# Patient Record
Sex: FEMALE | Race: WHITE | Employment: UNEMPLOYED | URBAN - METROPOLITAN AREA
[De-identification: names, ages, dates, MRNs, and addresses within clinical notes are randomized per-mention and may not be internally consistent; named-entity substitution may affect disease eponyms.]

---

## 2024-08-08 ENCOUNTER — APPOINTMENT (OUTPATIENT)
Dept: CT IMAGING | Age: 79
End: 2024-08-08
Payer: MEDICARE

## 2024-08-08 ENCOUNTER — HOSPITAL ENCOUNTER (OUTPATIENT)
Age: 79
Setting detail: OBSERVATION
Discharge: HOME | End: 2024-08-10
Attending: EMERGENCY MEDICINE | Admitting: FAMILY MEDICINE
Payer: MEDICARE

## 2024-08-08 ENCOUNTER — APPOINTMENT (OUTPATIENT)
Dept: GENERAL RADIOLOGY | Age: 79
End: 2024-08-08
Payer: MEDICARE

## 2024-08-08 DIAGNOSIS — R26.81 GAIT INSTABILITY: ICD-10-CM

## 2024-08-08 DIAGNOSIS — R91.1 LUNG NODULE SEEN ON IMAGING STUDY: ICD-10-CM

## 2024-08-08 DIAGNOSIS — A60.09 HERPES GENITALIS IN WOMEN: Chronic | ICD-10-CM

## 2024-08-08 DIAGNOSIS — I25.85 CHRONIC CORONARY MICROVASCULAR DYSFUNCTION: ICD-10-CM

## 2024-08-08 DIAGNOSIS — R42 DIZZINESS: Primary | ICD-10-CM

## 2024-08-08 DIAGNOSIS — E78.5 DYSLIPIDEMIA: ICD-10-CM

## 2024-08-08 DIAGNOSIS — I77.9 CAROTID ARTERY DISEASE, UNSPECIFIED LATERALITY, UNSPECIFIED TYPE (HCC): ICD-10-CM

## 2024-08-08 DIAGNOSIS — R07.9 CHEST PAIN, UNSPECIFIED TYPE: ICD-10-CM

## 2024-08-08 DIAGNOSIS — I10 PRIMARY HYPERTENSION: ICD-10-CM

## 2024-08-08 DIAGNOSIS — Z95.1 S/P CABG (CORONARY ARTERY BYPASS GRAFT): ICD-10-CM

## 2024-08-08 DIAGNOSIS — I10 ESSENTIAL HYPERTENSION: ICD-10-CM

## 2024-08-08 LAB
ALBUMIN SERPL-MCNC: 3 G/DL (ref 3.2–4.6)
ALBUMIN/GLOB SERPL: 1 (ref 1–1.9)
ALP SERPL-CCNC: 69 U/L (ref 35–104)
ALT SERPL-CCNC: 21 U/L (ref 12–65)
ANION GAP SERPL CALC-SCNC: 13 MMOL/L (ref 9–18)
AST SERPL-CCNC: 24 U/L (ref 15–37)
BASOPHILS # BLD: 0.1 K/UL (ref 0–0.2)
BASOPHILS NFR BLD: 1 % (ref 0–2)
BILIRUB SERPL-MCNC: <0.2 MG/DL (ref 0–1.2)
BUN SERPL-MCNC: 23 MG/DL (ref 8–23)
CALCIUM SERPL-MCNC: 10.2 MG/DL (ref 8.8–10.2)
CHLORIDE SERPL-SCNC: 92 MMOL/L (ref 98–107)
CO2 SERPL-SCNC: 24 MMOL/L (ref 20–28)
CREAT SERPL-MCNC: 0.72 MG/DL (ref 0.6–1.1)
DIFFERENTIAL METHOD BLD: ABNORMAL
EOSINOPHIL # BLD: 0.3 K/UL (ref 0–0.8)
EOSINOPHIL NFR BLD: 5 % (ref 0.5–7.8)
ERYTHROCYTE [DISTWIDTH] IN BLOOD BY AUTOMATED COUNT: 11.9 % (ref 11.9–14.6)
GLOBULIN SER CALC-MCNC: 3 G/DL (ref 2.3–3.5)
GLUCOSE SERPL-MCNC: 132 MG/DL (ref 70–99)
HCT VFR BLD AUTO: 38.3 % (ref 35.8–46.3)
HGB BLD-MCNC: 13.5 G/DL (ref 11.7–15.4)
IMM GRANULOCYTES # BLD AUTO: 0 K/UL (ref 0–0.5)
IMM GRANULOCYTES NFR BLD AUTO: 1 % (ref 0–5)
LIPASE SERPL-CCNC: 32 U/L (ref 13–60)
LYMPHOCYTES # BLD: 1.3 K/UL (ref 0.5–4.6)
LYMPHOCYTES NFR BLD: 25 % (ref 13–44)
MAGNESIUM SERPL-MCNC: 1.7 MG/DL (ref 1.8–2.4)
MCH RBC QN AUTO: 28.7 PG (ref 26.1–32.9)
MCHC RBC AUTO-ENTMCNC: 35.2 G/DL (ref 31.4–35)
MCV RBC AUTO: 81.5 FL (ref 82–102)
MONOCYTES # BLD: 0.7 K/UL (ref 0.1–1.3)
MONOCYTES NFR BLD: 14 % (ref 4–12)
NEUTS SEG # BLD: 2.7 K/UL (ref 1.7–8.2)
NEUTS SEG NFR BLD: 54 % (ref 43–78)
NRBC # BLD: 0 K/UL (ref 0–0.2)
NT PRO BNP: 256 PG/ML (ref 0–450)
PLATELET # BLD AUTO: 203 K/UL (ref 150–450)
PMV BLD AUTO: 10.3 FL (ref 9.4–12.3)
POTASSIUM SERPL-SCNC: 3.7 MMOL/L (ref 3.5–5.1)
PROT SERPL-MCNC: 6 G/DL (ref 6.3–8.2)
RBC # BLD AUTO: 4.7 M/UL (ref 4.05–5.2)
SODIUM SERPL-SCNC: 129 MMOL/L (ref 136–145)
TROPONIN T SERPL HS-MCNC: 13 NG/L (ref 0–14)
TROPONIN T SERPL HS-MCNC: 18 NG/L (ref 0–14)
WBC # BLD AUTO: 5 K/UL (ref 4.3–11.1)

## 2024-08-08 PROCEDURE — 84484 ASSAY OF TROPONIN QUANT: CPT

## 2024-08-08 PROCEDURE — 85025 COMPLETE CBC W/AUTO DIFF WBC: CPT

## 2024-08-08 PROCEDURE — 6360000002 HC RX W HCPCS: Performed by: EMERGENCY MEDICINE

## 2024-08-08 PROCEDURE — 83880 ASSAY OF NATRIURETIC PEPTIDE: CPT

## 2024-08-08 PROCEDURE — 99285 EMERGENCY DEPT VISIT HI MDM: CPT

## 2024-08-08 PROCEDURE — 71275 CT ANGIOGRAPHY CHEST: CPT

## 2024-08-08 PROCEDURE — 83690 ASSAY OF LIPASE: CPT

## 2024-08-08 PROCEDURE — 71045 X-RAY EXAM CHEST 1 VIEW: CPT

## 2024-08-08 PROCEDURE — 80053 COMPREHEN METABOLIC PANEL: CPT

## 2024-08-08 PROCEDURE — 6360000004 HC RX CONTRAST MEDICATION: Performed by: EMERGENCY MEDICINE

## 2024-08-08 PROCEDURE — 70450 CT HEAD/BRAIN W/O DYE: CPT

## 2024-08-08 PROCEDURE — 96365 THER/PROPH/DIAG IV INF INIT: CPT

## 2024-08-08 PROCEDURE — 83735 ASSAY OF MAGNESIUM: CPT

## 2024-08-08 RX ORDER — NITROGLYCERIN 0.4 MG/1
0.4 TABLET SUBLINGUAL EVERY 5 MIN PRN
Status: DISCONTINUED | OUTPATIENT
Start: 2024-08-08 | End: 2024-08-10 | Stop reason: HOSPADM

## 2024-08-08 RX ORDER — MAGNESIUM SULFATE 1 G/100ML
1000 INJECTION INTRAVENOUS
Status: COMPLETED | OUTPATIENT
Start: 2024-08-08 | End: 2024-08-09

## 2024-08-08 RX ADMIN — MAGNESIUM SULFATE HEPTAHYDRATE 1000 MG: 1 INJECTION, SOLUTION INTRAVENOUS at 23:09

## 2024-08-08 RX ADMIN — IOPAMIDOL 100 ML: 755 INJECTION, SOLUTION INTRAVENOUS at 22:13

## 2024-08-09 ENCOUNTER — APPOINTMENT (OUTPATIENT)
Dept: NON INVASIVE DIAGNOSTICS | Age: 79
End: 2024-08-09
Attending: FAMILY MEDICINE
Payer: MEDICARE

## 2024-08-09 PROBLEM — R07.9 CHEST PAIN: Status: ACTIVE | Noted: 2024-08-09

## 2024-08-09 PROBLEM — R42 DIZZINESS: Status: ACTIVE | Noted: 2024-08-09

## 2024-08-09 LAB
ANION GAP SERPL CALC-SCNC: 12 MMOL/L (ref 9–18)
APPEARANCE UR: CLEAR
BACTERIA URNS QL MICRO: NEGATIVE /HPF
BILIRUB UR QL: NEGATIVE
BUN SERPL-MCNC: 18 MG/DL (ref 8–23)
CALCIUM SERPL-MCNC: 9.7 MG/DL (ref 8.8–10.2)
CASTS URNS QL MICRO: 0 /LPF
CHLORIDE SERPL-SCNC: 100 MMOL/L (ref 98–107)
CHOLEST SERPL-MCNC: 135 MG/DL (ref 0–200)
CO2 SERPL-SCNC: 25 MMOL/L (ref 20–28)
COLOR UR: NORMAL
CREAT SERPL-MCNC: 0.71 MG/DL (ref 0.6–1.1)
CRYSTALS URNS QL MICRO: 0 /LPF
EPI CELLS #/AREA URNS HPF: NORMAL /HPF
ERYTHROCYTE [DISTWIDTH] IN BLOOD BY AUTOMATED COUNT: 12 % (ref 11.9–14.6)
EST. AVERAGE GLUCOSE BLD GHB EST-MCNC: 134 MG/DL
GLUCOSE SERPL-MCNC: 118 MG/DL (ref 70–99)
GLUCOSE UR STRIP.AUTO-MCNC: NEGATIVE MG/DL
HBA1C MFR BLD: 6.3 % (ref 0–5.6)
HCT VFR BLD AUTO: 40.3 % (ref 35.8–46.3)
HDLC SERPL-MCNC: 41 MG/DL (ref 40–60)
HDLC SERPL: 3.3 (ref 0–5)
HGB BLD-MCNC: 14.1 G/DL (ref 11.7–15.4)
HGB UR QL STRIP: NEGATIVE
HYALINE CASTS URNS QL MICRO: NORMAL /LPF
KETONES UR QL STRIP.AUTO: NEGATIVE MG/DL
LDLC SERPL CALC-MCNC: 76 MG/DL (ref 0–100)
LEUKOCYTE ESTERASE UR QL STRIP.AUTO: NEGATIVE
MCH RBC QN AUTO: 28.5 PG (ref 26.1–32.9)
MCHC RBC AUTO-ENTMCNC: 35 G/DL (ref 31.4–35)
MCV RBC AUTO: 81.4 FL (ref 82–102)
MUCOUS THREADS URNS QL MICRO: 0 /LPF
NITRITE UR QL STRIP.AUTO: NEGATIVE
NRBC # BLD: 0 K/UL (ref 0–0.2)
PH UR STRIP: 5 (ref 5–9)
PLATELET # BLD AUTO: 191 K/UL (ref 150–450)
PMV BLD AUTO: 10.1 FL (ref 9.4–12.3)
POTASSIUM SERPL-SCNC: 4 MMOL/L (ref 3.5–5.1)
PROT UR STRIP-MCNC: NEGATIVE MG/DL
RBC # BLD AUTO: 4.95 M/UL (ref 4.05–5.2)
RBC #/AREA URNS HPF: NORMAL /HPF
SODIUM SERPL-SCNC: 136 MMOL/L (ref 136–145)
SP GR UR REFRACTOMETRY: 1.02 (ref 1–1.02)
TRIGL SERPL-MCNC: 89 MG/DL (ref 0–150)
TROPONIN T SERPL HS-MCNC: 14 NG/L (ref 0–14)
URINE CULTURE IF INDICATED: NORMAL
UROBILINOGEN UR QL STRIP.AUTO: 0.2 EU/DL (ref 0.2–1)
VLDLC SERPL CALC-MCNC: 18 MG/DL (ref 6–23)
WBC # BLD AUTO: 5 K/UL (ref 4.3–11.1)
WBC URNS QL MICRO: NORMAL /HPF

## 2024-08-09 PROCEDURE — 85027 COMPLETE CBC AUTOMATED: CPT

## 2024-08-09 PROCEDURE — 94640 AIRWAY INHALATION TREATMENT: CPT

## 2024-08-09 PROCEDURE — 36415 COLL VENOUS BLD VENIPUNCTURE: CPT

## 2024-08-09 PROCEDURE — 6370000000 HC RX 637 (ALT 250 FOR IP): Performed by: FAMILY MEDICINE

## 2024-08-09 PROCEDURE — 80048 BASIC METABOLIC PNL TOTAL CA: CPT

## 2024-08-09 PROCEDURE — 96375 TX/PRO/DX INJ NEW DRUG ADDON: CPT

## 2024-08-09 PROCEDURE — 2580000003 HC RX 258: Performed by: FAMILY MEDICINE

## 2024-08-09 PROCEDURE — 2580000003 HC RX 258: Performed by: EMERGENCY MEDICINE

## 2024-08-09 PROCEDURE — 6370000000 HC RX 637 (ALT 250 FOR IP): Performed by: EMERGENCY MEDICINE

## 2024-08-09 PROCEDURE — 97112 NEUROMUSCULAR REEDUCATION: CPT

## 2024-08-09 PROCEDURE — 97165 OT EVAL LOW COMPLEX 30 MIN: CPT

## 2024-08-09 PROCEDURE — 81001 URINALYSIS AUTO W/SCOPE: CPT

## 2024-08-09 PROCEDURE — 84484 ASSAY OF TROPONIN QUANT: CPT

## 2024-08-09 PROCEDURE — G0378 HOSPITAL OBSERVATION PER HR: HCPCS

## 2024-08-09 PROCEDURE — 83036 HEMOGLOBIN GLYCOSYLATED A1C: CPT

## 2024-08-09 PROCEDURE — 80061 LIPID PANEL: CPT

## 2024-08-09 PROCEDURE — 6370000000 HC RX 637 (ALT 250 FOR IP): Performed by: INTERNAL MEDICINE

## 2024-08-09 PROCEDURE — 6360000002 HC RX W HCPCS: Performed by: EMERGENCY MEDICINE

## 2024-08-09 PROCEDURE — 97535 SELF CARE MNGMENT TRAINING: CPT

## 2024-08-09 RX ORDER — ONDANSETRON 2 MG/ML
4 INJECTION INTRAMUSCULAR; INTRAVENOUS EVERY 6 HOURS PRN
Status: DISCONTINUED | OUTPATIENT
Start: 2024-08-09 | End: 2024-08-10 | Stop reason: HOSPADM

## 2024-08-09 RX ORDER — IPRATROPIUM BROMIDE AND ALBUTEROL SULFATE 2.5; .5 MG/3ML; MG/3ML
1 SOLUTION RESPIRATORY (INHALATION)
Status: DISCONTINUED | OUTPATIENT
Start: 2024-08-09 | End: 2024-08-09

## 2024-08-09 RX ORDER — IPRATROPIUM BROMIDE AND ALBUTEROL SULFATE 2.5; .5 MG/3ML; MG/3ML
1 SOLUTION RESPIRATORY (INHALATION) EVERY 4 HOURS PRN
Status: DISCONTINUED | OUTPATIENT
Start: 2024-08-09 | End: 2024-08-10 | Stop reason: HOSPADM

## 2024-08-09 RX ORDER — AMLODIPINE BESYLATE 5 MG/1
TABLET ORAL
Status: ON HOLD | COMMUNITY
Start: 2023-12-05 | End: 2024-08-10 | Stop reason: HOSPADM

## 2024-08-09 RX ORDER — PANTOPRAZOLE SODIUM 40 MG/1
40 TABLET, DELAYED RELEASE ORAL
Status: DISCONTINUED | OUTPATIENT
Start: 2024-08-09 | End: 2024-08-09

## 2024-08-09 RX ORDER — LOSARTAN POTASSIUM 50 MG/1
50 TABLET ORAL DAILY
Status: DISCONTINUED | OUTPATIENT
Start: 2024-08-09 | End: 2024-08-09

## 2024-08-09 RX ORDER — LOSARTAN POTASSIUM 50 MG/1
50 TABLET ORAL 2 TIMES DAILY
Status: DISCONTINUED | OUTPATIENT
Start: 2024-08-09 | End: 2024-08-10 | Stop reason: HOSPADM

## 2024-08-09 RX ORDER — ASPIRIN 81 MG/1
81 TABLET, CHEWABLE ORAL
COMMUNITY

## 2024-08-09 RX ORDER — IPRATROPIUM BROMIDE AND ALBUTEROL SULFATE 2.5; .5 MG/3ML; MG/3ML
1 SOLUTION RESPIRATORY (INHALATION)
Status: COMPLETED | OUTPATIENT
Start: 2024-08-09 | End: 2024-08-09

## 2024-08-09 RX ORDER — ATORVASTATIN CALCIUM 40 MG/1
40 TABLET, FILM COATED ORAL NIGHTLY
Status: DISCONTINUED | OUTPATIENT
Start: 2024-08-09 | End: 2024-08-10 | Stop reason: HOSPADM

## 2024-08-09 RX ORDER — VALSARTAN 160 MG/1
160 TABLET ORAL 2 TIMES DAILY
Status: ON HOLD | COMMUNITY
Start: 2016-04-29 | End: 2024-08-10 | Stop reason: HOSPADM

## 2024-08-09 RX ORDER — CARVEDILOL 6.25 MG/1
6.25 TABLET ORAL 2 TIMES DAILY WITH MEALS
Status: DISCONTINUED | OUTPATIENT
Start: 2024-08-09 | End: 2024-08-09

## 2024-08-09 RX ORDER — CHOLECALCIFEROL (VITAMIN D3) 25 MCG
2000 TABLET ORAL DAILY
COMMUNITY

## 2024-08-09 RX ORDER — LOSARTAN POTASSIUM 50 MG/1
50 TABLET ORAL 2 TIMES DAILY
Status: DISCONTINUED | OUTPATIENT
Start: 2024-08-10 | End: 2024-08-09

## 2024-08-09 RX ORDER — AMLODIPINE BESYLATE 10 MG/1
5 TABLET ORAL DAILY
Status: DISCONTINUED | OUTPATIENT
Start: 2024-08-09 | End: 2024-08-09

## 2024-08-09 RX ORDER — ATORVASTATIN CALCIUM 40 MG/1
TABLET, FILM COATED ORAL
Status: ON HOLD | COMMUNITY
End: 2024-08-10 | Stop reason: HOSPADM

## 2024-08-09 RX ORDER — POLYETHYLENE GLYCOL 3350 17 G/17G
17 POWDER, FOR SOLUTION ORAL DAILY PRN
Status: DISCONTINUED | OUTPATIENT
Start: 2024-08-09 | End: 2024-08-10 | Stop reason: HOSPADM

## 2024-08-09 RX ORDER — SODIUM CHLORIDE 9 MG/ML
INJECTION, SOLUTION INTRAVENOUS CONTINUOUS
Status: DISCONTINUED | OUTPATIENT
Start: 2024-08-09 | End: 2024-08-10 | Stop reason: HOSPADM

## 2024-08-09 RX ORDER — SODIUM CHLORIDE 0.9 % (FLUSH) 0.9 %
5-40 SYRINGE (ML) INJECTION EVERY 12 HOURS SCHEDULED
Status: DISCONTINUED | OUTPATIENT
Start: 2024-08-09 | End: 2024-08-10 | Stop reason: HOSPADM

## 2024-08-09 RX ORDER — SODIUM CHLORIDE 9 MG/ML
INJECTION, SOLUTION INTRAVENOUS PRN
Status: DISCONTINUED | OUTPATIENT
Start: 2024-08-09 | End: 2024-08-10 | Stop reason: HOSPADM

## 2024-08-09 RX ORDER — ASPIRIN 81 MG/1
81 TABLET, CHEWABLE ORAL
Status: DISCONTINUED | OUTPATIENT
Start: 2024-08-09 | End: 2024-08-10 | Stop reason: HOSPADM

## 2024-08-09 RX ORDER — DOXAZOSIN MESYLATE 1 MG/1
1 TABLET ORAL DAILY
Status: DISCONTINUED | OUTPATIENT
Start: 2024-08-09 | End: 2024-08-09

## 2024-08-09 RX ORDER — PANTOPRAZOLE SODIUM 40 MG/1
40 TABLET, DELAYED RELEASE ORAL
Status: ON HOLD | COMMUNITY
Start: 2024-03-11 | End: 2024-08-10 | Stop reason: HOSPADM

## 2024-08-09 RX ORDER — CARVEDILOL 6.25 MG/1
6.25 TABLET ORAL
Status: ON HOLD | COMMUNITY
Start: 2022-02-06 | End: 2024-08-10 | Stop reason: HOSPADM

## 2024-08-09 RX ORDER — ACETAMINOPHEN 325 MG/1
650 TABLET ORAL EVERY 4 HOURS PRN
Status: DISCONTINUED | OUTPATIENT
Start: 2024-08-09 | End: 2024-08-10 | Stop reason: HOSPADM

## 2024-08-09 RX ORDER — ALPRAZOLAM 0.5 MG/1
0.5 TABLET ORAL DAILY PRN
COMMUNITY

## 2024-08-09 RX ORDER — ALIROCUMAB 150 MG/ML
150 INJECTION, SOLUTION SUBCUTANEOUS ONCE
COMMUNITY
Start: 2022-01-26

## 2024-08-09 RX ORDER — MECLIZINE HYDROCHLORIDE 25 MG/1
25 TABLET ORAL 3 TIMES DAILY PRN
COMMUNITY
Start: 2024-03-11

## 2024-08-09 RX ORDER — ONDANSETRON 4 MG/1
4 TABLET, ORALLY DISINTEGRATING ORAL EVERY 8 HOURS PRN
Status: DISCONTINUED | OUTPATIENT
Start: 2024-08-09 | End: 2024-08-10 | Stop reason: HOSPADM

## 2024-08-09 RX ORDER — IPRATROPIUM BROMIDE AND ALBUTEROL SULFATE 2.5; .5 MG/3ML; MG/3ML
3 SOLUTION RESPIRATORY (INHALATION)
COMMUNITY
Start: 2024-03-10

## 2024-08-09 RX ORDER — DOXAZOSIN MESYLATE 1 MG/1
TABLET ORAL
Status: ON HOLD | COMMUNITY
Start: 2023-12-14 | End: 2024-08-10 | Stop reason: HOSPADM

## 2024-08-09 RX ORDER — SODIUM CHLORIDE 0.9 % (FLUSH) 0.9 %
5-40 SYRINGE (ML) INJECTION PRN
Status: DISCONTINUED | OUTPATIENT
Start: 2024-08-09 | End: 2024-08-10 | Stop reason: HOSPADM

## 2024-08-09 RX ORDER — MECLIZINE HYDROCHLORIDE 25 MG/1
25 TABLET ORAL 3 TIMES DAILY PRN
Status: DISCONTINUED | OUTPATIENT
Start: 2024-08-09 | End: 2024-08-10 | Stop reason: HOSPADM

## 2024-08-09 RX ORDER — LOSARTAN POTASSIUM 50 MG/1
50 TABLET ORAL
Status: ON HOLD | COMMUNITY
Start: 2024-02-29 | End: 2024-08-10 | Stop reason: HOSPADM

## 2024-08-09 RX ORDER — FUROSEMIDE 20 MG/1
20 TABLET ORAL DAILY PRN
COMMUNITY
Start: 2024-04-26 | End: 2025-04-26

## 2024-08-09 RX ORDER — LANOLIN ALCOHOL/MO/W.PET/CERES
1000 CREAM (GRAM) TOPICAL DAILY
COMMUNITY

## 2024-08-09 RX ORDER — OXYCODONE HCL 10 MG/1
10 TABLET, FILM COATED, EXTENDED RELEASE ORAL DAILY
Status: DISCONTINUED | OUTPATIENT
Start: 2024-08-09 | End: 2024-08-10 | Stop reason: HOSPADM

## 2024-08-09 RX ORDER — OXYCODONE HCL 10 MG/1
10 TABLET, FILM COATED, EXTENDED RELEASE ORAL DAILY
COMMUNITY

## 2024-08-09 RX ORDER — ALPRAZOLAM 0.5 MG/1
0.5 TABLET ORAL DAILY PRN
Status: DISCONTINUED | OUTPATIENT
Start: 2024-08-09 | End: 2024-08-10 | Stop reason: HOSPADM

## 2024-08-09 RX ADMIN — SODIUM CHLORIDE: 9 INJECTION, SOLUTION INTRAVENOUS at 06:58

## 2024-08-09 RX ADMIN — OXYCODONE HYDROCHLORIDE 10 MG: 10 TABLET, FILM COATED, EXTENDED RELEASE ORAL at 10:44

## 2024-08-09 RX ADMIN — ALPRAZOLAM 0.5 MG: 0.5 TABLET ORAL at 13:04

## 2024-08-09 RX ADMIN — LOSARTAN POTASSIUM 50 MG: 50 TABLET, FILM COATED ORAL at 10:44

## 2024-08-09 RX ADMIN — ACETAMINOPHEN 650 MG: 325 TABLET ORAL at 13:04

## 2024-08-09 RX ADMIN — IPRATROPIUM BROMIDE AND ALBUTEROL SULFATE 1 DOSE: 2.5; .5 SOLUTION RESPIRATORY (INHALATION) at 00:21

## 2024-08-09 RX ADMIN — ASPIRIN 81 MG 81 MG: 81 TABLET ORAL at 10:44

## 2024-08-09 RX ADMIN — LOSARTAN POTASSIUM 50 MG: 50 TABLET, FILM COATED ORAL at 22:29

## 2024-08-09 RX ADMIN — PANTOPRAZOLE SODIUM 40 MG: 40 INJECTION, POWDER, FOR SOLUTION INTRAVENOUS at 00:45

## 2024-08-09 ASSESSMENT — LIFESTYLE VARIABLES
HOW MANY STANDARD DRINKS CONTAINING ALCOHOL DO YOU HAVE ON A TYPICAL DAY: PATIENT DOES NOT DRINK
HOW OFTEN DO YOU HAVE A DRINK CONTAINING ALCOHOL: NEVER

## 2024-08-09 ASSESSMENT — ENCOUNTER SYMPTOMS
NAUSEA: 0
ABDOMINAL PAIN: 0
VOMITING: 0
WHEEZING: 0
BACK PAIN: 0
COUGH: 0

## 2024-08-09 ASSESSMENT — PAIN DESCRIPTION - LOCATION: LOCATION: HEAD

## 2024-08-09 ASSESSMENT — PAIN SCALES - GENERAL: PAINLEVEL_OUTOF10: 4

## 2024-08-09 NOTE — PROGRESS NOTES
4 Eyes Skin Assessment     NAME:  Faviola Jonas  YOB: 1945  MEDICAL RECORD NUMBER:  [id number]    The patient is being assessed for  Admission    I agree that at least one RN has performed a thorough Head to Toe Skin Assessment on the patient. ALL assessment sites listed below have been assessed.      Areas assessed by both nurses:    Head, Face, Ears, Shoulders, Back, Chest, Arms, Elbows, Hands, Sacrum. Buttock, Coccyx, Ischium, and Legs. Feet and Heels        Does the Patient have a Wound? No noted wound(s)       Kush Prevention initiated by RN: No  Wound Care Orders initiated by RN: No    Pressure Injury (Stage 3,4, Unstageable, DTI, NWPT, and Complex wounds) if present, place Wound referral order by RN under : No    New Ostomies, if present place, Ostomy referral order under : No     Nurse 1 eSignature: Electronically signed by Rebeka Shay RN on 8/9/24 at 2:06 PM EDT    **SHARE this note so that the co-signing nurse can place an eSignature**    Nurse 2 eSignature: Electronically signed by NATHAN BAUMAN RN on 8/9/24 at 2:08 PM EDT

## 2024-08-09 NOTE — ED TRIAGE NOTES
Pt c/o chest pain for several days, became severe today.  Pt also c/o dizziness and unsteadiness.  Pt took a total of ASA 4 tabs 325mg this morning, and another ASA 325mg one hour prior to arrival.  Pt has also taken NTG 4-5 tabs today as well as an additional NTG tab by EMS.

## 2024-08-09 NOTE — ACP (ADVANCE CARE PLANNING)
Advance Care Planning   Healthcare Decision Maker:    Primary Decision Maker: Yandy Friedman - Friend - [phone], friend,per pt friend will contact HCPOA if needed, but states she is able to make medical decisions for self.    Click here to complete Healthcare Decision Makers including selection of the Healthcare Decision Maker Relationship (ie \"Primary\").  Today we documented Decision Maker(s) consistent with Legal Next of Kin hierarchy.

## 2024-08-09 NOTE — ASSESSMENT & PLAN NOTE
- Unclear etiology  - Improved now  - Reportedly radiated through to back.  CTA was negative for dissection  - Monitor on remote tele  - [employer] x2  - Check echo

## 2024-08-09 NOTE — H&P
Hospitalist History and Physical   Admit Date:  2024  8:47 PM   Name:  Faviola Jonas   Age:  78 y.o.  Sex:  female  :  1945   MRN:  [id number]     Presenting Complaint: Chest pain, dizziness  Reason(s) for Admission: Dizziness [R42]     History of Present Illness:   Faviola Jonas is a 78 y.o. female with medical history of HTN, CAD who presented to ED with chest pain.  Reports pain has been ongoing for several days, seems to radiate to back.  She took 4 aspirins this morning, as well as another 325mg aspiring and 4-5 nitroglycerin tabs this evening prior to presentation to the ER.  Additionally, over the past 4-5 days, she also reports feeling dizzy which is causing her some difficulty walking.  Denies any recent falls or loss of consciousness.  Upon ER evaluation, patient was initially hypertensive with BP of 193/73.  Labs are unremarkable with exception of Na of 129 and Mg of 1.7.  Initial trop was 13.0, repeat was 18.0.  CT chest with contrast shows:  IMPRESSION:  Severe atherosclerotic vascular disease but no evidence of acute aortic injury. 7 mm spiculated nodule in the left upper lobe is concerning. Recommend either evaluation with PET/CT or follow-up chest CT in 3 months.  Moderate centrilobular emphysema with findings suggesting chronic bronchitis.  Prior CABG.  CT head shows:  IMPRESSION:  1. Chronic small vessel ischemic change.   2. Otherwise, no CT evidence for acute pathology.  3. If patient continues to have symptoms or if there remains any further  clinical concern, MRI may help better delineate.  Hospitalist asked to admit.    Review of Systems:  10 systems reviewed and negative except as noted in HPI.  Assessment & Plan:   * Dizziness  Assessment & Plan  - Unclear etiology  - Could be vertigo  - Improved now  - Restart home meclizine  - MRI brain ordered  - Checking echo  - Neuro checks, PT/OT consulted  - Also check UA    Chest pain  Assessment & Plan  - Unclear etiology  -  Oral    valsartan (DIOVAN) 160 mg, Oral, 2 TIMES DAILY    vitamin B-12 (CYANOCOBALAMIN) 1,000 mcg, Oral, DAILY    vitamin D3 (CHOLECALCIFEROL) 2,000 Units, Oral, DAILY       Objective:   Patient Vitals for the past 24 hrs:   Temp Pulse Resp BP SpO2   08/09/24 0146 -- 69 19 (!) 122/52 95 %   08/09/24 0126 -- 69 -- (!) 125/49 94 %   08/09/24 0115 -- 71 -- 106/74 93 %   08/09/24 0048 -- 60 -- (!) 132/48 95 %   08/08/24 2333 -- 60 -- (!) 145/49 91 %   08/08/24 2323 -- 57 -- (!) 155/48 93 %   08/08/24 2200 -- 58 15 (!) 154/58 94 %   08/08/24 2130 -- 64 14 (!) 174/57 94 %   08/08/24 2129 -- 61 18 (!) 161/60 93 %   08/08/24 2052 98.3 °F (36.8 °C) 74 16 (!) 193/73 96 %          Estimated body mass index is 21.12 kg/m² as calculated from the following:    Height as of this encounter: 1.638 m (5' 4.5\").    Weight as of this encounter: 56.7 kg (125 lb).  No intake or output data in the 24 hours ending 08/09/24 0506      Physical Exam:  General:    Well nourished.  No overt distress  Head:  Normocephalic, atraumatic  Eyes:  Sclerae appear normal.  Pupils equally round.    HENT:  Nares appear normal, no drainage.  Moist mucous membranes  Neck:  No restricted ROM.  Trachea midline  CV:   RRR.  S1/S2 auscultated  Lungs:   CTAB.  No wheezing, rhonchi, or rales.  Appears even, unlabored  Abdomen:   Bowel sounds present.  Soft, nontender, nondistended.    Extremities: Warm and dry.  No cyanosis or clubbing.  No edema.    Skin:     No rashes.  Normal turgor.  Normal coloration  Neuro:  Cranial nerves II-XII grossly intact.   Sensation intact  Psych:  Normal mood and affect.  Alert and oriented x3    Data Ordered and Personally Reviewed:    Last 24hr Labs:  Recent Results (from the past 24 hour(s))   CMP    Collection Time: 08/08/24  8:57 PM   Result Value Ref Range    Sodium 129 (L) 136 - 145 mmol/L    Potassium 3.7 3.5 - 5.1 mmol/L    Chloride 92 (L) 98 - 107 mmol/L    CO2 24 20 - 28 mmol/L    Anion Gap 13 9 - 18 mmol/L    Glucose

## 2024-08-09 NOTE — PROGRESS NOTES
ACUTE OCCUPATIONAL THERAPY GOALS:   (Developed with and agreed upon by patient and/or caregiver.)  1. Patient will complete full body bathing and dressing with independence and adaptive equipment as needed.   2. Patient will complete toileting with independently.   3. Patient will tolerate 15 minutes of OT treatment with no rest breaks to increase activity tolerance for ADLs.   4. Patient will complete functional transfers with independently and adaptive equipment as needed.   5. Patient will complete grooming with independence in standing at sink level.    Timeframe: 1 visit ALL GOALS MET AT  8/9/2024      OCCUPATIONAL THERAPY Initial Assessment, Daily Note, Discharge, and AM       OT Visit Days: 1  Acknowledge Orders  Time  OT Charge Capture  Rehab Caseload Tracker      Faviola Jonas is a 78 y.o. female   PRIMARY DIAGNOSIS: Dizziness  Dizziness [R42]       Reason for Referral: Generalized Muscle Weakness (M62.81)  Other lack of cordination (R27.8)  Difficulty in walking, Not elsewhere classified (R26.2)  Observation: Payor: GENERIC MANAGED MEDICARE / Plan: GENERIC MANAGED MEDICARE / Product Type: *No Product type* /     ASSESSMENT:     REHAB RECOMMENDATIONS:   Recommendation to date pending progress:  Setting:  No further skilled occupational therapy after discharge from hospital    Equipment:    None     ASSESSMENT:  Ms. Jonas is an 77 YO right hand dominant WF admitted with dizziness and chest pain for several days, decreased ambulation, and diagnosed with above. PMH includes significant heart history w HTN, CAD with stents, CABG, TIA, COPD, DLD, vertigo.  At baseline, pt lives alone in a 1 level apartment with level entry at entrance. Pt is normally INDEPENDENT with ADLs, INDEPENDENT with all IADLs, and completes ambulation with no DME walking 6-10 miles a day, still driving, and pt reports no falls. Pt has a T/S with GBs.    Today, pt seen in ER. Pt found trying to get on bedpan in her stretcher

## 2024-08-09 NOTE — PROGRESS NOTES
Pt arrived on unit around 1330. Pt has not complained of pain this shift. Pt ambulated with PCT around hallways multiple times. Pt and staff felt safe to make pt independent. Pt walks 5-10 miles a day and requests she be able to walk around unit. Pt educated not to leave floor. Urine sample obtained and sent to lab for urinalysis order.     All known needs met at this time. Bed is currently low, call light was left in reach, and pt was encouraged to ask for assistance. Pt was left resting in bed with no complaints. Will give bedside report to oncoming nurse.

## 2024-08-09 NOTE — CARE COORDINATION
Chart review complete, CM met with pt at bedside, pt found laying in bed alert and oriented x4, pt states lives alone in independent senior living apartment. States she is very active at home ambulates several miles per day. No current DME in home. NO anticipated dc needs noted at this time.  Demographics, insurance and PCP confirmed with pt.    CM will remain available to assist as needed with dc needs.       08/09/24 1004   Service Assessment   Patient Orientation Alert and Oriented   Cognition Alert   History Provided By Patient   Primary Caregiver Self   Accompanied By/Relationship none   Support Systems Friends/Neighbors   Patient's Healthcare Decision Maker is: Legal Next of Kin   PCP Verified by CM Yes   Last Visit to PCP Within last 3 months   Prior Functional Level Independent in ADLs/IADLs   Current Functional Level Independent in ADLs/IADLs   Can patient return to prior living arrangement Yes   Ability to make needs known: Good   Family able to assist with home care needs: Yes   Would you like for me to discuss the discharge plan with any other family members/significant others, and if so, who? Yes   Financial Resources Medicaid;Medicare   Community Resources None   CM/SW Referral Other (see comment)  (dispo)   Social/Functional History   Lives With Spouse   Type of Home Apartment  (independent living apartment.)   Home Layout One level   Home Access Level entry   Bathroom Shower/Tub Walk-in shower  (with rails)   Bathroom Toilet Standard   Bathroom Equipment Grab bars in shower   Bathroom Accessibility Accessible   Home Equipment None   Receives Help From Other (comment)  (na)   ADL Assistance Independent   Homemaking Assistance Independent   Homemaking Responsibilities Yes   Ambulation Assistance Independent   Transfer Assistance Independent   Active  Yes   Mode of Transportation Car   Education na   Occupation Retired   Type of Occupation na   Discharge Planning   Type of Residence Apartment

## 2024-08-09 NOTE — PROGRESS NOTES
TRANSFER - IN REPORT:    Verbal report received from Kathy Jonas  being received from ED for routine progression of patient care      Report consisted of patient's Situation, Background, Assessment and   Recommendations(SBAR).     Information from the following report(s) Nurse Handoff Report was reviewed with the receiving nurse.    Opportunity for questions and clarification was provided.      Assessment will be completed upon patient's arrival to unit and care assumed.

## 2024-08-09 NOTE — H&P
[facility] Cardiology Initial Cardiac Evaluation                 Date of  Admission: 8/8/2024  8:47 PM     Primary Care Physician: Rajwinder Brito MD  Primary Cardiologist: Cheriton  Referring Physician: Dr Dodson  Attending Physician: Dr Lucio    CC: CP and SOB      Faviola Jonas is a 78 y.o. female admitted for Dizziness [R42].  She has a h/o CAD s/p CABG (2009 LIMA-LAD, SVG-OM; failed LIMA requiring PCI to LM; [facility] 2022 w stable disease), htn, B carotid stenosis, hld (intolerant of statins on praluent), h/o vertigo (CT head/MRI 2024), presented to the ER 8/8/24 w CP, dizziness. She recently moved to [place] cardiac care in [facility].     She has had CP in the past but cannot remember how long ago (months or years?), and was at rest last evening when she had sudden onset of back pain radiating to the substernal region, hard to quantify but \"not pull you to the floor pain\", at first rodriguez then more pressure, with no nausea but she did feel clammy.  She has periodic SOB which is not worse. She always has mild dizziness w this pain, it did not improved w laying down, no LOC. Her CP lasted an hour, she took four asa and it improved, she took a fifth ASA and pain resolved and she called EMS.  No recurrent pain.  Pt is very active. CP did not change w exertion or position.  No LE edema.     In ER , K 3.7, cr .71, pBNP 256, HS trop 13, 18 and 14, albumin 3, WBC 5, hgb 14, platelets 203, CT head chronic small vessel disease, CTA showed cardiac atherosclerosis, spiculated BILL nodule 7 mm, moderate emphysema and suggestions chronic bronchitis. EKG NSR w rate 64 w NSST/T wave changes.  /73. Admitted by hospitalist. MRI brain pending.      Past cardiac work up:  s/p CABG (2009 LIMA-LAD, SVG-OM; failed LIMA requiring PCI to LM)  1/2022 TTE EF 77%, mild LAE, mild MR, mild-mod TR,  RVSP 39  1/2022 [facility] w stable disease    Soc: Worked as a  and in management, no tobacco since  Estimated Avg Glucose 134 mg/dL   Lipid Panel    Collection Time: 08/09/24  6:56 AM   Result Value Ref Range    Cholesterol, Total 135 0 - 200 MG/DL    Triglycerides 89 0 - 150 MG/DL    HDL 41 40 - 60 MG/DL    LDL Cholesterol 76 0 - 100 MG/DL    VLDL Cholesterol Calculated 18 6 - 23 MG/DL    Chol/HDL Ratio 3.3 0.0 - 5.0     Basic Metabolic Panel w/ Reflex to MG    Collection Time: 08/09/24  6:56 AM   Result Value Ref Range    Sodium 136 136 - 145 mmol/L    Potassium 4.0 3.5 - 5.1 mmol/L    Chloride 100 98 - 107 mmol/L    CO2 25 20 - 28 mmol/L    Anion Gap 12 9 - 18 mmol/L    Glucose 118 (H) 70 - 99 mg/dL    BUN 18 8 - 23 MG/DL    Creatinine 0.71 0.60 - 1.10 MG/DL    Est, Glom Filt Rate 85 >60 ml/min/1.73m2    Calcium 9.7 8.8 - 10.2 MG/DL   Troponin    Collection Time: 08/09/24  6:56 AM   Result Value Ref Range    Troponin T 14.0 0 - 14 ng/L     Lab Results   Component Value Date/Time    CHOL 135 08/09/2024 06:56 AM    HDL 41 08/09/2024 06:56 AM    LDL 76 08/09/2024 06:56 AM    VLDL 18 08/09/2024 06:56 AM     EKG- nsr      ASSESSMENT and PLAN      78-year-old female with atypical chest pain.  She is being evaluated for neurologic events.  I do not think catheterization is warranted at this time.  I did offer outpatient stress testing and she declines.  She will follow-up in our office.        Thank you for allowing me to participate in this patient's care.  Please call or contact me if there are any questions or concerns regarding the above.      BREONNA SINGLETARY MD  08/09/24  3:28 PM

## 2024-08-09 NOTE — ASSESSMENT & PLAN NOTE
- Unclear etiology  - Could be vertigo  - Improved now  - Restart home meclizine  - MRI brain ordered  - Checking echo  - Neuro checks, PT/OT consulted  - Also check UA

## 2024-08-09 NOTE — PROGRESS NOTES
Hospitalist Progress Note   Admit Date:  2024  8:47 PM   Name:  Faviola Jonas   Age:  78 y.o.  Sex:  female  :  1945   MRN:  [id number]   Room:  [facility] [place]    Presenting/Chief Complaint: Chest Pain     Reason(s) for Admission: Dizziness [R42]  Essential hypertension [I10]  Gait instability [R26.81]  Chest pain, unspecified type [R07.9]     Hospital Course:   Faviola Jonas is a 78 y.o. female past medical history significant for CAD status post CABG, and stents, hypertension.  Patient presented secondary to dizziness with acute onset, but states this did improve after receiving aspirin.  Patient did undergo CT head with no acute findings noted.      Subjective & 24hr Events:   Patient resting comfortably on examination.  Patient states that dizziness has improved at this time.  Patient without any current complaints on exam.      Assessment & Plan:     Dizziness  Unclear etiology but may be secondary to vertigo  Patient had CT head without any acute findings noted  Did have discussion with patient about MRI of the brain at this time but patient is refusing to undergo further imaging.  Discussed with her that there is risk of possible stroke but patient still would not like MRI brain at this time  PT OT consulted  Echocardiogram pending    Chest pain  Status post CABG  Troponin downtrending  Patient seen by cardiology given concerning symptoms  No plan for any interventions at this time pending echocardiogram    Hypertension  Blood pressure slightly elevated  Continue home medication  Continue to monitor    Incidental finding  7 mm spiculated nodule left upper lobe  This was discussed with patient and she states she does not want any further follow-up at this at this time.  She is aware this could be cancer and states \"I do not care\"        Anticipated Discharge Arrangements:   Home  Hopeful discharge if echocardiogram normal and nothing noticed on telemetry    PT/OT evals ordered?  Therapy evals  Calcium 9.7 8.8 - 10.2 MG/DL   Troponin    Collection Time: 08/09/24  6:56 AM   Result Value Ref Range    Troponin T 14.0 0 - 14 ng/L       No results for input(s): \"COVID19\" in the last 72 hours.    Current Meds:  Current Facility-Administered Medications   Medication Dose Route Frequency    ALPRAZolam (XANAX) tablet 0.5 mg  0.5 mg Oral Daily PRN    aspirin chewable tablet 81 mg  81 mg Oral Once per day on Mon Wed Fri    atorvastatin (LIPITOR) tablet 40 mg  40 mg Oral Nightly    losartan (COZAAR) tablet 50 mg  50 mg Oral Daily    meclizine (ANTIVERT) tablet 25 mg  25 mg Oral TID PRN    oxyCODONE (OXYCONTIN) extended release tablet 10 mg  10 mg Oral Daily    sodium chloride flush 0.9 % injection 5-40 mL  5-40 mL IntraVENous 2 times per day    sodium chloride flush 0.9 % injection 5-40 mL  5-40 mL IntraVENous PRN    0.9 % sodium chloride infusion   IntraVENous PRN    ondansetron (ZOFRAN-ODT) disintegrating tablet 4 mg  4 mg Oral Q8H PRN    Or    ondansetron (ZOFRAN) injection 4 mg  4 mg IntraVENous Q6H PRN    polyethylene glycol (GLYCOLAX) packet 17 g  17 g Oral Daily PRN    0.9 % sodium chloride infusion   IntraVENous Continuous    ipratropium 0.5 mg-albuterol 2.5 mg (DUONEB) nebulizer solution 1 Dose  1 Dose Inhalation Q4H PRN    acetaminophen (TYLENOL) tablet 650 mg  650 mg Oral Q4H PRN    nitroGLYCERIN (NITROSTAT) SL tablet 0.4 mg  0.4 mg SubLINGual Q5 Min PRN       Signed:  Calixto Dodson MD    Part of this note may have been written by using a voice dictation software.  The note has been proof read but may still contain some grammatical/other typographical errors.

## 2024-08-09 NOTE — PROGRESS NOTES
Physical Therapy Note:    PT order received and chart reviewed.  Upon arrival to floor OT reports pt was functioning at baseline and pt reported she did not want or need therapy at this time.  Pt screened at this time, will discontinue PT order at this time, please re-consult if decline in function.  Thank you.    HAI ESPARZA, PT     Rehab Caseload Tracker

## 2024-08-09 NOTE — PROGRESS NOTES
TRANSFER - OUT REPORT:    Verbal report given to AMIRA Quinonez on Faviola Jonas  being transferred to 6th Floor for routine progression of patient care       Report consisted of patient’s Situation, Background, Assessment and   Recommendations(SBAR).     Information from the following report(s) Nurse Handoff Report was reviewed with the receiving nurse.    Opportunity for questions and clarification was provided.      Patient transported with:   Monitor  Pt belongings     Motorcycle/see chief complaint quote

## 2024-08-09 NOTE — ED PROVIDER NOTES
Emergency Department Provider Note       PCP: Rajwinder Brito MD   Age: 78 y.o.   Sex: female     DISPOSITION Admitted 08/09/2024 12:34:35 AM       ICD-10-CM    1. Dizziness  R42 Echo (TTE) complete (PRN contrast/bubble/strain/3D)      2. Gait instability  R26.81       3. Chest pain, unspecified type  R07.9       4. Essential hypertension  I10           Medical Decision Making     78-year-old female with history of CAD status post CABG, hypertension, TIA, COPD presents with numerous complaints including worsening severe upper back pain radiating through to her chest that started earlier today.  Patient was given aspirin, nitroglycerin and route.  Patient also states that over the past 4 days she has had worsening dizziness and gait instability.  Denies any facial droop, slurred speech.  Denies any vision changes.   Normal neuro exam. Pt endorses continued dizziness and gait instability.   EKG with sinus rhythm.  Heart rate 64.  No ST elevation.  Slight ST depression noted anterolateral leads.  Initial troponin 13.  Repeat troponin 18.  Magnesium 1.7.  Sodium 129.  WBC 5.2.  H&H stable.  CT head with chronic small vessel ischemic changes noted.  No acute findings noted.  Chest x-ray with interstitial thickening suggestive of pulmonary congestion.  Given pain radiating to her chest concern for aortic dissection.  CTA chest with severe atherosclerotic vascular disease but no aortic injury.  7 mm spiculated nodule in the left upper lobe.  Prior CABG.  Finding suggestive of chronic bronchitis.  Concern is that patient is continuing to have dizziness and gait instability, will need MRI to rule out CVA.   Case discussed with hospitalist.  Dr. Estelle Orellana to admit at this time.    ED Course as of 08/09/24 0328   Thu Aug 08, 2024   2216 CXR FINDINGS: A single AP of the chest demonstrates enlarged heart. Interstitial  thickening noted. There is no evidence of pneumothorax or pleural effusion.     IMPRESSION:    Otherwise, no CT evidence for acute pathology.  3. If patient continues to have symptoms or if there remains any further  clinical concern, MRI may help better delineate.      Electronically signed by Starla Heredia   CTA CHEST W WO CONTRAST    Narrative    Indication:  Stabbing sharp pain radiating from back through chest;  hypertensive; concern for aortic dissection.    Technique: 2D Axial images of the chest were obtained after the administration  of 100 ml Isovue 370 intravenous iodinated contrast media. Contrast was used to  best identify solid structures.  Images also viewed on an independent  workstation including 3D rendering.    All CT scans at this facility are performed using dose reduction/dose modulation  techniques, as appropriate the performed exam, including the following:   Automated Exposure Control; Adjustment of the mA and/or kV according to patient  size (this includes techniques or standardized protocols for targeted exams  where dose is matched to indication/reason for exam); and Use of Iterative  Reconstruction Technique.    Comparison: None    Findings:    Neck base: Normal   Lungs: Moderate centrilobular emphysema. Areas of groundglass attenuation  especially in the lung bases likely secondary to atelectasis. There is a  spiculated nodule in the left upper lobe measuring up to 7 mm (best seen on  image 176 of series 301). Mild bronchial wall thickening suggesting chronic  bronchitis..  Mediastinum: Dilatation of the main pulmonary artery can be seen with pulmonary  arterial hypertension. No evidence of pulmonary embolism..  Cardiac: Severe coronary artery calcifications status post CABG. The heart is  mildly enlarged..  CHEST WALL: Normal  Bones: Mild thoracic spondylosis. Postoperative median sternotomy.  Upper abdomen: Low-density lesions in the liver and right kidney likely  reflecting cysts. The gallbladder is surgically absent. Small hiatal hernia.    Aorta: Severe calcified plaque

## 2024-08-10 ENCOUNTER — APPOINTMENT (OUTPATIENT)
Dept: NON INVASIVE DIAGNOSTICS | Age: 79
End: 2024-08-10
Attending: FAMILY MEDICINE
Payer: MEDICARE

## 2024-08-10 VITALS
SYSTOLIC BLOOD PRESSURE: 148 MMHG | OXYGEN SATURATION: 92 % | HEART RATE: 77 BPM | DIASTOLIC BLOOD PRESSURE: 63 MMHG | HEIGHT: 65 IN | RESPIRATION RATE: 20 BRPM | TEMPERATURE: 97.9 F | BODY MASS INDEX: 20.83 KG/M2 | WEIGHT: 125 LBS

## 2024-08-10 PROBLEM — R42 DIZZINESS: Status: RESOLVED | Noted: 2024-08-09 | Resolved: 2024-08-10

## 2024-08-10 PROBLEM — I10 ESSENTIAL HYPERTENSION: Chronic | Status: ACTIVE | Noted: 2024-08-10

## 2024-08-10 PROBLEM — R07.9 CHEST PAIN: Status: RESOLVED | Noted: 2024-08-09 | Resolved: 2024-08-10

## 2024-08-10 PROBLEM — A60.09 HERPES GENITALIS IN WOMEN: Chronic | Status: ACTIVE | Noted: 2024-08-10

## 2024-08-10 PROBLEM — R91.1 LUNG NODULE SEEN ON IMAGING STUDY: Status: ACTIVE | Noted: 2024-08-10

## 2024-08-10 LAB
ANION GAP SERPL CALC-SCNC: 9 MMOL/L (ref 9–18)
BUN SERPL-MCNC: 12 MG/DL (ref 8–23)
CALCIUM SERPL-MCNC: 8.8 MG/DL (ref 8.8–10.2)
CHLORIDE SERPL-SCNC: 105 MMOL/L (ref 98–107)
CO2 SERPL-SCNC: 24 MMOL/L (ref 20–28)
CREAT SERPL-MCNC: 0.75 MG/DL (ref 0.6–1.1)
ECHO AO ASC DIAM: 3.1 CM
ECHO AO ASCENDING AORTA INDEX: 1.93 CM/M2
ECHO AO ROOT DIAM: 2.9 CM
ECHO AO ROOT INDEX: 1.8 CM/M2
ECHO AV AREA PEAK VELOCITY: 1.7 CM2
ECHO AV AREA VTI: 1.9 CM2
ECHO AV AREA/BSA PEAK VELOCITY: 1.1 CM2/M2
ECHO AV AREA/BSA VTI: 1.2 CM2/M2
ECHO AV MEAN GRADIENT: 3 MMHG
ECHO AV MEAN GRADIENT: 3 MMHG
ECHO AV MEAN VELOCITY: 0.8 M/S
ECHO AV PEAK GRADIENT: 6 MMHG
ECHO AV PEAK VELOCITY: 1.2 M/S
ECHO AV VELOCITY RATIO: 0.67
ECHO AV VTI: 28.3 CM
ECHO BSA: 1.61 M2
ECHO EST RA PRESSURE: 3 MMHG
ECHO IVC PROX: 1.9 CM
ECHO LA AREA 2C: 19.4 CM2
ECHO LA AREA 4C: 20.9 CM2
ECHO LA DIAMETER INDEX: 2.48 CM/M2
ECHO LA DIAMETER: 4 CM
ECHO LA MAJOR AXIS: 5.5 CM
ECHO LA MINOR AXIS: 5.4 CM
ECHO LA TO AORTIC ROOT RATIO: 1.38
ECHO LA VOL BP: 59 ML (ref 22–52)
ECHO LA VOL MOD A2C: 55 ML (ref 22–52)
ECHO LA VOL MOD A4C: 63 ML (ref 22–52)
ECHO LA VOL/BSA BIPLANE: 37 ML/M2 (ref 16–34)
ECHO LA VOLUME INDEX MOD A2C: 34 ML/M2 (ref 16–34)
ECHO LA VOLUME INDEX MOD A4C: 39 ML/M2 (ref 16–34)
ECHO LV E' LATERAL VELOCITY: 10 CM/S
ECHO LV E' SEPTAL VELOCITY: 7 CM/S
ECHO LV EDV A2C: 90 ML
ECHO LV EDV A4C: 87 ML
ECHO LV EDV INDEX A4C: 54 ML/M2
ECHO LV EDV NDEX A2C: 56 ML/M2
ECHO LV EJECTION FRACTION A2C: 57 %
ECHO LV EJECTION FRACTION A4C: 58 %
ECHO LV EJECTION FRACTION BIPLANE: 58 % (ref 55–100)
ECHO LV ESV A2C: 39 ML
ECHO LV ESV A4C: 37 ML
ECHO LV ESV INDEX A2C: 24 ML/M2
ECHO LV ESV INDEX A4C: 23 ML/M2
ECHO LV FRACTIONAL SHORTENING: 29 % (ref 28–44)
ECHO LV INTERNAL DIMENSION DIASTOLE INDEX: 2.8 CM/M2
ECHO LV INTERNAL DIMENSION DIASTOLIC: 4.5 CM (ref 3.9–5.3)
ECHO LV INTERNAL DIMENSION SYSTOLIC INDEX: 1.99 CM/M2
ECHO LV INTERNAL DIMENSION SYSTOLIC: 3.2 CM
ECHO LV IVSD: 1.1 CM (ref 0.6–0.9)
ECHO LV MASS 2D: 175 G (ref 67–162)
ECHO LV MASS INDEX 2D: 108.7 G/M2 (ref 43–95)
ECHO LV POSTERIOR WALL DIASTOLIC: 1.1 CM (ref 0.6–0.9)
ECHO LV RELATIVE WALL THICKNESS RATIO: 0.49
ECHO LVOT AREA: 2.5 CM2
ECHO LVOT AV VTI INDEX: 0.73
ECHO LVOT DIAM: 1.8 CM
ECHO LVOT MEAN GRADIENT: 2 MMHG
ECHO LVOT PEAK GRADIENT: 3 MMHG
ECHO LVOT PEAK VELOCITY: 0.8 M/S
ECHO LVOT STROKE VOLUME INDEX: 32.9 ML/M2
ECHO LVOT SV: 52.9 ML
ECHO LVOT VTI: 20.8 CM
ECHO MV A VELOCITY: 0.97 M/S
ECHO MV AREA VTI: 2.2 CM2
ECHO MV E DECELERATION TIME (DT): 187 MS
ECHO MV E VELOCITY: 0.92 M/S
ECHO MV E/A RATIO: 0.95
ECHO MV E/E' LATERAL: 9.2
ECHO MV E/E' RATIO (AVERAGED): 11.17
ECHO MV E/E' SEPTAL: 13.14
ECHO MV LVOT VTI INDEX: 1.18
ECHO MV MAX VELOCITY: 1 M/S
ECHO MV MEAN GRADIENT: 2 MMHG
ECHO MV MEAN VELOCITY: 0.6 M/S
ECHO MV PEAK GRADIENT: 4 MMHG
ECHO MV VTI: 24.6 CM
ECHO PV ACCELERATION TIME (AT): 85 MS
ECHO PV MAX VELOCITY: 0.9 M/S
ECHO PV PEAK GRADIENT: 3 MMHG
ECHO RIGHT VENTRICULAR SYSTOLIC PRESSURE (RVSP): 40 MMHG
ECHO RV BASAL DIMENSION: 3 CM
ECHO RV FREE WALL PEAK S': 9 CM/S
ECHO RV INTERNAL DIMENSION: 3.4 CM
ECHO RV TAPSE: 1.8 CM (ref 1.7–?)
ECHO TV REGURGITANT MAX VELOCITY: 3.04 M/S
ECHO TV REGURGITANT PEAK GRADIENT: 37 MMHG
ERYTHROCYTE [DISTWIDTH] IN BLOOD BY AUTOMATED COUNT: 12.4 % (ref 11.9–14.6)
GLUCOSE SERPL-MCNC: 99 MG/DL (ref 70–99)
HCT VFR BLD AUTO: 38.2 % (ref 35.8–46.3)
HGB BLD-MCNC: 12.7 G/DL (ref 11.7–15.4)
MCH RBC QN AUTO: 27.9 PG (ref 26.1–32.9)
MCHC RBC AUTO-ENTMCNC: 33.2 G/DL (ref 31.4–35)
MCV RBC AUTO: 83.8 FL (ref 82–102)
NRBC # BLD: 0 K/UL (ref 0–0.2)
PLATELET # BLD AUTO: 205 K/UL (ref 150–450)
PMV BLD AUTO: 10.2 FL (ref 9.4–12.3)
POTASSIUM SERPL-SCNC: 4.4 MMOL/L (ref 3.5–5.1)
RBC # BLD AUTO: 4.56 M/UL (ref 4.05–5.2)
SODIUM SERPL-SCNC: 138 MMOL/L (ref 136–145)
WBC # BLD AUTO: 4.6 K/UL (ref 4.3–11.1)

## 2024-08-10 PROCEDURE — 80048 BASIC METABOLIC PNL TOTAL CA: CPT

## 2024-08-10 PROCEDURE — G0378 HOSPITAL OBSERVATION PER HR: HCPCS

## 2024-08-10 PROCEDURE — 36415 COLL VENOUS BLD VENIPUNCTURE: CPT

## 2024-08-10 PROCEDURE — 2580000003 HC RX 258: Performed by: FAMILY MEDICINE

## 2024-08-10 PROCEDURE — 6370000000 HC RX 637 (ALT 250 FOR IP): Performed by: FAMILY MEDICINE

## 2024-08-10 PROCEDURE — 93306 TTE W/DOPPLER COMPLETE: CPT

## 2024-08-10 PROCEDURE — 93306 TTE W/DOPPLER COMPLETE: CPT | Performed by: INTERNAL MEDICINE

## 2024-08-10 PROCEDURE — 85027 COMPLETE CBC AUTOMATED: CPT

## 2024-08-10 PROCEDURE — 6370000000 HC RX 637 (ALT 250 FOR IP): Performed by: INTERNAL MEDICINE

## 2024-08-10 RX ORDER — NITROGLYCERIN 0.4 MG/1
0.4 TABLET SUBLINGUAL EVERY 5 MIN PRN
Qty: 25 TABLET | Refills: 3 | Status: SHIPPED | OUTPATIENT
Start: 2024-08-10

## 2024-08-10 RX ORDER — LOSARTAN POTASSIUM 50 MG/1
50 TABLET ORAL 2 TIMES DAILY
Qty: 30 TABLET | Refills: 3 | Status: SHIPPED | OUTPATIENT
Start: 2024-08-10

## 2024-08-10 RX ORDER — VALACYCLOVIR HYDROCHLORIDE 1 G/1
2000 TABLET, FILM COATED ORAL 2 TIMES DAILY
Qty: 8 TABLET | Refills: 0 | Status: SHIPPED | OUTPATIENT
Start: 2024-08-10 | End: 2024-08-12

## 2024-08-10 RX ADMIN — ALPRAZOLAM 0.5 MG: 0.5 TABLET ORAL at 11:55

## 2024-08-10 RX ADMIN — SODIUM CHLORIDE, PRESERVATIVE FREE 10 ML: 5 INJECTION INTRAVENOUS at 08:18

## 2024-08-10 RX ADMIN — LOSARTAN POTASSIUM 50 MG: 50 TABLET, FILM COATED ORAL at 08:17

## 2024-08-10 RX ADMIN — OXYCODONE HYDROCHLORIDE 10 MG: 10 TABLET, FILM COATED, EXTENDED RELEASE ORAL at 08:17

## 2024-08-10 RX ADMIN — ALPRAZOLAM 0.5 MG: 0.5 TABLET ORAL at 01:48

## 2024-08-10 RX ADMIN — ACETAMINOPHEN 650 MG: 325 TABLET ORAL at 11:56

## 2024-08-10 ASSESSMENT — PAIN SCALES - GENERAL
PAINLEVEL_OUTOF10: 0
PAINLEVEL_OUTOF10: 2

## 2024-08-10 NOTE — CARE COORDINATION
Pt is for discharge home today with family and no needs/supportive care orders recieved for MSW at this time.       08/10/24 1144   Service Assessment   Patient Orientation Alert and Oriented   Cognition Alert   History Provided By Patient   Primary Caregiver Self   Support Systems Spouse/Significant Other   Patient's Healthcare Decision Maker is: Legal Next of Kin   Social/Functional History   Lives With Spouse   Type of Home Apartment  (independent living apartment.)   Home Layout One level   Home Access Level entry   Bathroom Shower/Tub Walk-in shower  (with rails)   Bathroom Toilet Standard   Bathroom Equipment Grab bars in shower   Bathroom Accessibility Accessible   Home Equipment None   Receives Help From Other (comment)  (na)   ADL Assistance Independent   Homemaking Assistance Independent   Homemaking Responsibilities Yes   Ambulation Assistance Independent   Transfer Assistance Independent   Active  Yes   Mode of Transportation Car   Education na   Occupation Retired   Type of Occupation na   Services At/After Discharge   Transition of Care Consult (CM Consult) Discharge Planning   Services At/After Discharge None    Resource Information Provided? No   Mode of Transport at Discharge Other (see comment)  (Spouse)   Confirm Follow Up Transport Family   Condition of Participation: Discharge Planning   The Plan for Transition of Care is related to the following treatment goals: Pt will return home at discharge   The Patient and/Or Patient Representative agree with the Discharge Plan? Yes   Freedom of Choice list was provided with basic dialogue that supports the patient's individualized plan of care/goals, treatment preferences, and shares the quality data associated with the providers?  Yes

## 2024-08-10 NOTE — DISCHARGE INSTRUCTIONS
Dizziness: Care Instructions  Overview  Dizziness is the feeling of unsteadiness or fuzziness in your head. It is different than having vertigo, which is a feeling that the room is spinning or that you are moving or falling. It is also different from lightheadedness, which is the feeling that you are about to faint.  It can be hard to know what causes dizziness. Some people feel dizzy when they have migraine headaches. Sometimes bouts of flu can make you feel dizzy. Some medical conditions, such as heart problems or high blood pressure, can make you feel dizzy. Many medicines can cause dizziness, including medicines for high blood pressure, pain, or anxiety.  If a medicine causes your symptoms, your doctor may recommend that you stop or change the medicine. If it is a problem with your heart, you may need medicine to help your heart work better. If there is no clear reason for your symptoms, your doctor may suggest watching and waiting for a while to see if the dizziness goes away on its own.  Follow-up care is a key part of your treatment and safety. Be sure to make and go to all appointments, and call your doctor if you are having problems. It's also a good idea to know your test results and keep a list of the medicines you take.  How can you care for yourself at home?  If your doctor recommends or prescribes medicine, take it exactly as directed. Call your doctor if you think you are having a problem with your medicine.  Do not drive while you feel dizzy.  Try to prevent falls. Steps you can take include:  Using nonskid mats, adding grab bars near the tub, and using night-lights.  Clearing your home so that walkways are free of anything you might trip on.  Letting family and friends know that you have been feeling dizzy. This will help them know how to help you.  When should you call for help?   Call 911 anytime you think you may need emergency care. For example, call if:    You passed out (lost

## 2024-09-17 ENCOUNTER — TELEPHONE (OUTPATIENT)
Age: 79
End: 2024-09-17

## 2024-09-17 RX ORDER — ALIROCUMAB 150 MG/ML
150 INJECTION, SOLUTION SUBCUTANEOUS ONCE
Qty: 2 ML | Refills: 1 | Status: SHIPPED | OUTPATIENT
Start: 2024-09-17 | End: 2024-09-17

## 2024-10-15 ENCOUNTER — INITIAL CONSULT (OUTPATIENT)
Age: 79
End: 2024-10-15
Payer: MEDICARE

## 2024-10-15 VITALS
BODY MASS INDEX: 21.68 KG/M2 | HEIGHT: 64 IN | DIASTOLIC BLOOD PRESSURE: 76 MMHG | SYSTOLIC BLOOD PRESSURE: 144 MMHG | HEART RATE: 72 BPM | WEIGHT: 127 LBS

## 2024-10-15 DIAGNOSIS — M62.838 MUSCLE SPASM: ICD-10-CM

## 2024-10-15 DIAGNOSIS — E78.5 DYSLIPIDEMIA: ICD-10-CM

## 2024-10-15 DIAGNOSIS — Z95.1 S/P CABG (CORONARY ARTERY BYPASS GRAFT): Primary | ICD-10-CM

## 2024-10-15 DIAGNOSIS — I10 ESSENTIAL HYPERTENSION: Chronic | ICD-10-CM

## 2024-10-15 PROCEDURE — 93000 ELECTROCARDIOGRAM COMPLETE: CPT | Performed by: INTERNAL MEDICINE

## 2024-10-15 PROCEDURE — 3078F DIAST BP <80 MM HG: CPT | Performed by: INTERNAL MEDICINE

## 2024-10-15 PROCEDURE — 3077F SYST BP >= 140 MM HG: CPT | Performed by: INTERNAL MEDICINE

## 2024-10-15 PROCEDURE — 1123F ACP DISCUSS/DSCN MKR DOCD: CPT | Performed by: INTERNAL MEDICINE

## 2024-10-15 PROCEDURE — 99214 OFFICE O/P EST MOD 30 MIN: CPT | Performed by: INTERNAL MEDICINE

## 2024-10-15 RX ORDER — ALIROCUMAB 150 MG/ML
150 INJECTION, SOLUTION SUBCUTANEOUS 2 TIMES DAILY
Qty: 2 ADJUSTABLE DOSE PRE-FILLED PEN SYRINGE | Refills: 11 | Status: SHIPPED | OUTPATIENT
Start: 2024-10-15 | End: 2024-10-16 | Stop reason: SDUPTHER

## 2024-10-15 RX ORDER — VALSARTAN 160 MG/1
160 TABLET ORAL 2 TIMES DAILY
COMMUNITY
End: 2024-10-15 | Stop reason: SDUPTHER

## 2024-10-15 RX ORDER — CARISOPRODOL 350 MG/1
350 TABLET ORAL 2 TIMES DAILY PRN
Qty: 60 TABLET | Refills: 0 | Status: SHIPPED | OUTPATIENT
Start: 2024-10-15 | End: 2024-12-14

## 2024-10-15 RX ORDER — NITROGLYCERIN 0.4 MG/1
0.4 TABLET SUBLINGUAL EVERY 5 MIN PRN
Qty: 25 TABLET | Refills: 3 | Status: SHIPPED | OUTPATIENT
Start: 2024-10-15

## 2024-10-15 RX ORDER — VALSARTAN 160 MG/1
160 TABLET ORAL 2 TIMES DAILY
Qty: 90 TABLET | Refills: 3 | Status: SHIPPED | OUTPATIENT
Start: 2024-10-15

## 2024-10-15 RX ORDER — FERROUS GLUCONATE 324(38)MG
324 TABLET ORAL
COMMUNITY

## 2024-10-15 RX ORDER — ALIROCUMAB 150 MG/ML
150 INJECTION, SOLUTION SUBCUTANEOUS
COMMUNITY
End: 2024-10-15 | Stop reason: SDUPTHER

## 2024-10-15 NOTE — TELEPHONE ENCOUNTER
Patient said there is a problem with the way her Praluent was written, the pharmacy can't fill it because of the way it is written. The [facility] on [facility] in [place].

## 2024-10-15 NOTE — PROGRESS NOTES
ipratropium 0.5 mg-albuterol 2.5 mg (DUONEB) 0.5-2.5 (3) MG/3ML SOLN nebulizer solution, Inhale 3 mLs into the lungs, Disp: , Rfl:     oxyCODONE (OXYCONTIN) 10 MG extended release tablet, Take 1 tablet by mouth daily., Disp: , Rfl:     Omega-3 Fat Ac-Cholecalciferol (OMEGA ESSENTIALS/VIT D3 PO), Take 1 g by mouth 2 times daily, Disp: , Rfl:     losartan (COZAAR) 50 MG tablet, Take 1 tablet by mouth in the morning and at bedtime (Patient not taking: Reported on 10/15/2024), Disp: 30 tablet, Rfl: 3    meclizine (ANTIVERT) 25 MG tablet, Take 1 tablet by mouth 3 times daily as needed, Disp: , Rfl:   Prn metoprolol 12.5mg with palpitation        PHYSICAL EXAM:     BP (!) 144/76   Pulse 72   Ht 1.626 m (5' 4\")   Wt 57.6 kg (127 lb) Comment: with shoes  BMI 21.80 kg/m²    Constitutional: Oriented to person, place, and time. Appears well-developed and well-nourished.   Head: Normocephalic and atraumatic.   Neck: Neck supple.   Cardiovascular: Normal rate and regular rhythm with no murmur -No JVP  Pulmonary/Chest: Breath sounds normal.   Abdominal: Soft.   Musculoskeletal: No edema.   Neurological: Alert and oriented to person, place, and time.   Skin: Skin is warm and dry.   Psychiatric: Normal mood and affect.   Vitals reviewed    Wt Readings from Last 3 Encounters:   10/15/24 57.6 kg (127 lb)   08/08/24 56.7 kg (125 lb)        Medical problems and test results were reviewed with the patient today.     No results found for any visits on 10/15/24.      No results found for this or any previous visit (from the past 672 hour(s)).  Lab Results   Component Value Date/Time    CHOL 135 08/09/2024 06:56 AM    HDL 41 08/09/2024 06:56 AM    LDL 76 08/09/2024 06:56 AM    VLDL 18 08/09/2024 06:56 AM   EKG-Sinus Rhythm   nsst changes  hr 72    Outside records reviewed by me and summarized-right groin only access site- high bifurcation- lima atretic and patent svg to om- Lm stented    ASSESSMENT and PLAN  CAD S/P CABG (coronary

## 2024-10-16 DIAGNOSIS — E78.5 DYSLIPIDEMIA: ICD-10-CM

## 2024-10-16 RX ORDER — ALIROCUMAB 150 MG/ML
INJECTION, SOLUTION SUBCUTANEOUS
OUTPATIENT
Start: 2024-10-16

## 2024-10-16 RX ORDER — ALIROCUMAB 150 MG/ML
150 INJECTION, SOLUTION SUBCUTANEOUS
Qty: 2 ADJUSTABLE DOSE PRE-FILLED PEN SYRINGE | Refills: 11 | Status: SHIPPED | OUTPATIENT
Start: 2024-10-16

## 2024-10-16 NOTE — TELEPHONE ENCOUNTER
Pt is calling saying [facility] told her they can not fill her Praluent because how it is written,pharmacy told pt they have tried to reach out to us with no response,Please call  [phone] and see how it needs to be written to fill and then call pt and let her know this has been taken care of please

## 2024-10-16 NOTE — TELEPHONE ENCOUNTER
Requested Prescriptions     Pending Prescriptions Disp Refills    alirocumab (PRALUENT) 150 MG/ML SOAJ 2 Adjustable Dose Pre-filled Pen Syringe 11     Sig: Inject 150 mg into the skin every 14 days    .hermes  Spoke with pt informed her we would send in rx with correct directions. Pt voiced her understanding

## 2024-10-29 NOTE — TELEPHONE ENCOUNTER
Pt stated she received a letter from her insurance that states she can not get her injection. She would like to speak with a nurse.

## 2024-10-30 NOTE — TELEPHONE ENCOUNTER
I called and spoke to pt, her insurance has changed and no longer covers Praulent. I explained that we can send in Rx for Repatha.     Requested Prescriptions     Pending Prescriptions Disp Refills    Evolocumab 140 MG/ML SOAJ 2 Adjustable Dose Pre-filled Pen Syringe 11     Sig: Inject 140 mg into the skin every 14 days

## 2024-11-02 ENCOUNTER — CLINICAL DOCUMENTATION (OUTPATIENT)
Age: 79
End: 2024-11-02

## 2024-11-06 NOTE — TELEPHONE ENCOUNTER
Patient called stating she has the following concerns :    In regard to Repatha / Evolocumab 140 MG/ML SOAJ   Rx not on plan or has certain limit  Provided temporary supply up to now  Question : Patient would like to continue with this medication, what are next steps?  [employer] Medicare Advantage    Please call and advise.

## 2024-11-06 NOTE — TELEPHONE ENCOUNTER
Called patient and informed her that PA for Praluent was approved on 11/02/2024.     Patient was sent a letter, from insurance stating the Repatha will not be covered.     She will bring a copy of the letter so we can call insurance to figure this out

## 2024-11-07 ENCOUNTER — TELEPHONE (OUTPATIENT)
Age: 79
End: 2024-11-07

## 2024-11-07 NOTE — TELEPHONE ENCOUNTER
Patient brought paperwork from insurance that states patients Repatha needs prior authorization to continue getting medication.     Patients insurance [facility] Medicare Advantage    Member ID [id number]

## 2024-11-07 NOTE — TELEPHONE ENCOUNTER
Repatha SureClick 140MG/ML auto-injectors    PA submitted    Approved today by Xavier 2017 [facility]  Request Reference Number: [id number]. REPATHA SURE INJ 140MG/ML is approved through 05/07/2025.    Left message with information above on patient's voicemail.

## 2024-11-07 NOTE — TELEPHONE ENCOUNTER
Patient called stating she has the following question :    Patient called offering the original insurance letter that states they were offering temporary coverage for Praluent.  Question : They patient would like to know if there is any need for this letter?    Please call and advise.   

## 2024-11-07 NOTE — TELEPHONE ENCOUNTER
PT brought [employer] paperwork by the office and requested it be given directly to Rosi BUSBY gave the paper to her.

## 2024-11-13 ENCOUNTER — TELEPHONE (OUTPATIENT)
Age: 79
End: 2024-11-13

## 2024-11-13 NOTE — TELEPHONE ENCOUNTER
PT dropped off a verification form to be completed at check in. I put the form in Dr. Lucio's box.

## 2024-11-18 ENCOUNTER — TELEPHONE (OUTPATIENT)
Dept: FAMILY MEDICINE CLINIC | Facility: CLINIC | Age: 79
End: 2024-11-18

## 2024-11-18 NOTE — TELEPHONE ENCOUNTER
----- Message from Kory SCHULER sent at 11/18/2024  9:02 AM EST -----  Regarding: [facility] Appointment Request  [facility] Appointment Request    Patient needs appointment for [facility] Appointment Type: New Patient.    Patient Requested Dates(s): First available  Patient Requested Time: anytime  Provider Name: Shahdi Valentin MD    Reason for Appointment Request: New Patient - No appointments available during search  --------------------------------------------------------------------------------------------------------------------------    Relationship to Patient: Self     Call Back Information: OK to leave message on voicemail  Preferred Call Back Number: Phone [phone]    Patient wants to establish care.

## 2024-11-18 NOTE — TELEPHONE ENCOUNTER
Pt was called and left a message that  is now scheduling NP appointments in November 2025. Pt was informed that if she would like to schedule for that timeframe please contact the office.

## 2024-11-20 NOTE — TELEPHONE ENCOUNTER
Spoke with pt and inform she does not have heart failure. Pt will contact  and bring a different form if needed.

## 2024-12-02 ENCOUNTER — TELEPHONE (OUTPATIENT)
Age: 79
End: 2024-12-02

## 2024-12-02 NOTE — TELEPHONE ENCOUNTER
Joi from [facility] came by [facility] office to drop off a chronic condition form for Dr Lucio to fill out and fax off for pt please call pt and joi back once this has been sent will place form in breanne's box

## 2025-04-17 ENCOUNTER — OFFICE VISIT (OUTPATIENT)
Age: 80
End: 2025-04-17
Payer: MEDICARE

## 2025-04-17 VITALS
SYSTOLIC BLOOD PRESSURE: 138 MMHG | DIASTOLIC BLOOD PRESSURE: 80 MMHG | HEART RATE: 66 BPM | WEIGHT: 139 LBS | HEIGHT: 65 IN | BODY MASS INDEX: 23.16 KG/M2

## 2025-04-17 DIAGNOSIS — Z95.1 S/P CABG (CORONARY ARTERY BYPASS GRAFT): ICD-10-CM

## 2025-04-17 DIAGNOSIS — I10 ESSENTIAL HYPERTENSION: Primary | Chronic | ICD-10-CM

## 2025-04-17 DIAGNOSIS — E78.5 DYSLIPIDEMIA: ICD-10-CM

## 2025-04-17 PROCEDURE — 1126F AMNT PAIN NOTED NONE PRSNT: CPT | Performed by: INTERNAL MEDICINE

## 2025-04-17 PROCEDURE — 1123F ACP DISCUSS/DSCN MKR DOCD: CPT | Performed by: INTERNAL MEDICINE

## 2025-04-17 PROCEDURE — 3079F DIAST BP 80-89 MM HG: CPT | Performed by: INTERNAL MEDICINE

## 2025-04-17 PROCEDURE — 99214 OFFICE O/P EST MOD 30 MIN: CPT | Performed by: INTERNAL MEDICINE

## 2025-04-17 PROCEDURE — 3075F SYST BP GE 130 - 139MM HG: CPT | Performed by: INTERNAL MEDICINE

## 2025-04-17 NOTE — PROGRESS NOTES
[facility] CARDIOLOGY  [address] [place] [address], [address]  [place] [address]  PHONE: [phone]      25    NAME:  Faviola Jonas  : 1945  MRN: [id number]       SUBJECTIVE:   Faviola Jonas is a 79 y.o. female seen for a follow up visit regarding the following:     Chief Complaint   Patient presents with    Coronary Artery Disease         HPI:    No cp or ames. No orthopnea or pnd. No palpitations or syncope.  Walking up to three miles a day.    Past Medical History, Past Surgical History, Family history, Social History, and Medications were all reviewed with the patient today and updated as necessary.     Current Outpatient Medications   Medication Sig Dispense Refill    BIOTIN PO Take by mouth      Ascorbic Acid (VITAMIN C PO) Take by mouth      Multiple Vitamin (MULTIVITAMIN) capsule Take 1 capsule by mouth daily      Evolocumab 140 MG/ML SOAJ Inject 140 mg into the skin every 14 days 6 Adjustable Dose Pre-filled Pen Syringe 3    ferrous gluconate (FERGON) 324 (38 Fe) MG tablet Take 1 tablet by mouth daily (with breakfast)      valsartan (DIOVAN) 160 MG tablet Take 1 tablet by mouth 2 times daily 90 tablet 3    nitroGLYCERIN (NITROSTAT) 0.4 MG SL tablet Place 1 tablet under the tongue every 5 minutes as needed for Chest pain (Give first dose now.) up to max of 3 total doses. If no relief after 1 dose, call 911. 25 tablet 3    aspirin 81 MG chewable tablet Take 1 tablet by mouth three times a week  and Friday      vitamin D3 (CHOLECALCIFEROL) 25 MCG (1000 UT) TABS tablet Take 2 tablets by mouth daily      vitamin B-12 (CYANOCOBALAMIN) 1000 MCG tablet Take 1 tablet by mouth daily      furosemide (LASIX) 20 MG tablet Take 1 tablet by mouth daily as needed      meclizine (ANTIVERT) 25 MG tablet Take 1 tablet by mouth 3 times daily as needed      Omega-3 Fat Ac-Cholecalciferol (OMEGA ESSENTIALS/VIT D3 PO) Take 1 g by mouth 2 times daily       No current facility-administered